# Patient Record
Sex: MALE | Race: WHITE | NOT HISPANIC OR LATINO | URBAN - METROPOLITAN AREA
[De-identification: names, ages, dates, MRNs, and addresses within clinical notes are randomized per-mention and may not be internally consistent; named-entity substitution may affect disease eponyms.]

---

## 2017-11-20 ENCOUNTER — OFFICE (OUTPATIENT)
Dept: URBAN - METROPOLITAN AREA CLINIC 11 | Facility: CLINIC | Age: 72
End: 2017-11-20

## 2017-11-20 VITALS
WEIGHT: 195 LBS | HEIGHT: 69 IN | SYSTOLIC BLOOD PRESSURE: 154 MMHG | DIASTOLIC BLOOD PRESSURE: 94 MMHG | HEART RATE: 76 BPM

## 2017-11-20 DIAGNOSIS — K21.9 GASTRO-ESOPHAGEAL REFLUX DISEASE WITHOUT ESOPHAGITIS: ICD-10-CM

## 2017-11-20 DIAGNOSIS — K52.0 GASTROENTERITIS AND COLITIS DUE TO RADIATION: ICD-10-CM

## 2017-11-20 PROCEDURE — 99213 OFFICE O/P EST LOW 20 MIN: CPT | Performed by: INTERNAL MEDICINE

## 2018-07-03 ENCOUNTER — OFFICE (OUTPATIENT)
Dept: URBAN - METROPOLITAN AREA CLINIC 11 | Facility: CLINIC | Age: 73
End: 2018-07-03

## 2018-07-03 VITALS
SYSTOLIC BLOOD PRESSURE: 152 MMHG | HEIGHT: 69 IN | HEART RATE: 79 BPM | WEIGHT: 190 LBS | DIASTOLIC BLOOD PRESSURE: 83 MMHG

## 2018-07-03 DIAGNOSIS — Z86.010 PERSONAL HISTORY OF COLONIC POLYPS: ICD-10-CM

## 2018-07-03 DIAGNOSIS — K21.9 GASTRO-ESOPHAGEAL REFLUX DISEASE WITHOUT ESOPHAGITIS: ICD-10-CM

## 2018-07-03 DIAGNOSIS — R13.10 DYSPHAGIA, UNSPECIFIED: ICD-10-CM

## 2018-07-03 PROCEDURE — 99213 OFFICE O/P EST LOW 20 MIN: CPT | Performed by: INTERNAL MEDICINE

## 2018-07-03 RX ORDER — POLYETHYLENE GLYCOL 3350, SODIUM SULFATE ANHYDROUS, SODIUM BICARBONATE, SODIUM CHLORIDE, POTASSIUM CHLORIDE 236; 22.74; 6.74; 5.86; 2.97 G/4L; G/4L; G/4L; G/4L; G/4L
POWDER, FOR SOLUTION ORAL
Qty: 1 | Refills: 0 | Status: COMPLETED
Start: 2018-07-03 | End: 2018-07-17

## 2018-07-03 NOTE — SERVICEHPINOTES
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  Random upper sternal/suprasternal dysphagia to solids with symptoms persisting for up to 5-10 minutes.  No rejection of food bolus.  No retrosternal burning.  Also with random fecal urgency without obvious precipitants except for possibly spicy foods.  Stool may be hard or soft.  Occasional small volume bleeding with bright red blood on the tissue.

## 2018-07-17 ENCOUNTER — OFFICE (OUTPATIENT)
Dept: URBAN - METROPOLITAN AREA PATHOLOGY 22 | Facility: PATHOLOGY | Age: 73
End: 2018-07-17
Payer: MEDICARE

## 2018-07-17 ENCOUNTER — AMBULATORY SURGICAL CENTER (OUTPATIENT)
Dept: URBAN - METROPOLITAN AREA SURGERY 3 | Facility: SURGERY | Age: 73
End: 2018-07-17

## 2018-07-17 ENCOUNTER — AMBULATORY SURGICAL CENTER (OUTPATIENT)
Dept: URBAN - METROPOLITAN AREA SURGERY 3 | Facility: SURGERY | Age: 73
End: 2018-07-17
Payer: MEDICARE

## 2018-07-17 VITALS
HEART RATE: 64 BPM | RESPIRATION RATE: 16 BRPM | HEART RATE: 62 BPM | SYSTOLIC BLOOD PRESSURE: 148 MMHG | DIASTOLIC BLOOD PRESSURE: 78 MMHG | OXYGEN SATURATION: 97 % | HEART RATE: 63 BPM | HEART RATE: 69 BPM | HEART RATE: 62 BPM | SYSTOLIC BLOOD PRESSURE: 130 MMHG | WEIGHT: 185 LBS | RESPIRATION RATE: 17 BRPM | OXYGEN SATURATION: 95 % | DIASTOLIC BLOOD PRESSURE: 78 MMHG | DIASTOLIC BLOOD PRESSURE: 85 MMHG | SYSTOLIC BLOOD PRESSURE: 138 MMHG | RESPIRATION RATE: 18 BRPM | DIASTOLIC BLOOD PRESSURE: 93 MMHG | WEIGHT: 185 LBS | DIASTOLIC BLOOD PRESSURE: 74 MMHG | DIASTOLIC BLOOD PRESSURE: 82 MMHG | SYSTOLIC BLOOD PRESSURE: 124 MMHG | SYSTOLIC BLOOD PRESSURE: 121 MMHG | SYSTOLIC BLOOD PRESSURE: 148 MMHG | TEMPERATURE: 97.2 F | HEIGHT: 69 IN | TEMPERATURE: 97.2 F | DIASTOLIC BLOOD PRESSURE: 93 MMHG | TEMPERATURE: 97 F | HEART RATE: 69 BPM | SYSTOLIC BLOOD PRESSURE: 138 MMHG | OXYGEN SATURATION: 96 % | OXYGEN SATURATION: 95 % | SYSTOLIC BLOOD PRESSURE: 121 MMHG | RESPIRATION RATE: 17 BRPM | DIASTOLIC BLOOD PRESSURE: 85 MMHG | OXYGEN SATURATION: 97 % | SYSTOLIC BLOOD PRESSURE: 124 MMHG | HEIGHT: 69 IN | DIASTOLIC BLOOD PRESSURE: 74 MMHG | SYSTOLIC BLOOD PRESSURE: 130 MMHG | HEART RATE: 64 BPM | OXYGEN SATURATION: 96 % | RESPIRATION RATE: 16 BRPM | DIASTOLIC BLOOD PRESSURE: 82 MMHG | TEMPERATURE: 97 F | HEART RATE: 63 BPM | RESPIRATION RATE: 18 BRPM

## 2018-07-17 DIAGNOSIS — D12.2 BENIGN NEOPLASM OF ASCENDING COLON: ICD-10-CM

## 2018-07-17 DIAGNOSIS — Z86.010 PERSONAL HISTORY OF COLONIC POLYPS: ICD-10-CM

## 2018-07-17 DIAGNOSIS — K31.89 OTHER DISEASES OF STOMACH AND DUODENUM: ICD-10-CM

## 2018-07-17 DIAGNOSIS — K44.9 DIAPHRAGMATIC HERNIA WITHOUT OBSTRUCTION OR GANGRENE: ICD-10-CM

## 2018-07-17 DIAGNOSIS — R13.10 DYSPHAGIA, UNSPECIFIED: ICD-10-CM

## 2018-07-17 DIAGNOSIS — K57.30 DIVERTICULOSIS OF LARGE INTESTINE WITHOUT PERFORATION OR ABS: ICD-10-CM

## 2018-07-17 DIAGNOSIS — K55.20 ANGIODYSPLASIA OF COLON WITHOUT HEMORRHAGE: ICD-10-CM

## 2018-07-17 DIAGNOSIS — K20.9 ESOPHAGITIS, UNSPECIFIED: ICD-10-CM

## 2018-07-17 PROCEDURE — 88342 IMHCHEM/IMCYTCHM 1ST ANTB: CPT | Performed by: INTERNAL MEDICINE

## 2018-07-17 PROCEDURE — 43239 EGD BIOPSY SINGLE/MULTIPLE: CPT | Mod: 51 | Performed by: INTERNAL MEDICINE

## 2018-07-17 PROCEDURE — 45385 COLONOSCOPY W/LESION REMOVAL: CPT | Mod: PT | Performed by: INTERNAL MEDICINE

## 2018-07-17 PROCEDURE — 88313 SPECIAL STAINS GROUP 2: CPT | Performed by: INTERNAL MEDICINE

## 2018-07-17 PROCEDURE — 88305 TISSUE EXAM BY PATHOLOGIST: CPT | Performed by: INTERNAL MEDICINE

## 2018-07-17 RX ORDER — OMEPRAZOLE 40 MG/1
40 CAPSULE, DELAYED RELEASE ORAL
Qty: 90 | Refills: 3 | Status: COMPLETED
End: 2021-09-30

## 2018-09-18 ENCOUNTER — AMBULATORY SURGICAL CENTER (OUTPATIENT)
Dept: URBAN - METROPOLITAN AREA SURGERY 3 | Facility: SURGERY | Age: 73
End: 2018-09-18
Payer: MEDICARE

## 2018-09-18 ENCOUNTER — AMBULATORY SURGICAL CENTER (OUTPATIENT)
Dept: URBAN - METROPOLITAN AREA SURGERY 3 | Facility: SURGERY | Age: 73
End: 2018-09-18

## 2018-09-18 VITALS
RESPIRATION RATE: 14 BRPM | RESPIRATION RATE: 19 BRPM | SYSTOLIC BLOOD PRESSURE: 109 MMHG | SYSTOLIC BLOOD PRESSURE: 110 MMHG | DIASTOLIC BLOOD PRESSURE: 68 MMHG | SYSTOLIC BLOOD PRESSURE: 157 MMHG | SYSTOLIC BLOOD PRESSURE: 114 MMHG | DIASTOLIC BLOOD PRESSURE: 71 MMHG | RESPIRATION RATE: 16 BRPM | TEMPERATURE: 97.1 F | OXYGEN SATURATION: 94 % | HEART RATE: 65 BPM | HEART RATE: 68 BPM | SYSTOLIC BLOOD PRESSURE: 110 MMHG | WEIGHT: 180 LBS | SYSTOLIC BLOOD PRESSURE: 116 MMHG | DIASTOLIC BLOOD PRESSURE: 72 MMHG | SYSTOLIC BLOOD PRESSURE: 108 MMHG | DIASTOLIC BLOOD PRESSURE: 72 MMHG | HEIGHT: 68 IN | TEMPERATURE: 98 F | HEART RATE: 62 BPM | HEART RATE: 63 BPM | RESPIRATION RATE: 17 BRPM | SYSTOLIC BLOOD PRESSURE: 109 MMHG | OXYGEN SATURATION: 93 % | TEMPERATURE: 97.1 F | RESPIRATION RATE: 16 BRPM | SYSTOLIC BLOOD PRESSURE: 157 MMHG | WEIGHT: 180 LBS | DIASTOLIC BLOOD PRESSURE: 65 MMHG | DIASTOLIC BLOOD PRESSURE: 87 MMHG | OXYGEN SATURATION: 94 % | DIASTOLIC BLOOD PRESSURE: 68 MMHG | OXYGEN SATURATION: 95 % | DIASTOLIC BLOOD PRESSURE: 65 MMHG | HEART RATE: 68 BPM | DIASTOLIC BLOOD PRESSURE: 71 MMHG | OXYGEN SATURATION: 95 % | OXYGEN SATURATION: 97 % | DIASTOLIC BLOOD PRESSURE: 87 MMHG | RESPIRATION RATE: 17 BRPM | OXYGEN SATURATION: 93 % | TEMPERATURE: 98 F | HEART RATE: 65 BPM | SYSTOLIC BLOOD PRESSURE: 114 MMHG | SYSTOLIC BLOOD PRESSURE: 116 MMHG | OXYGEN SATURATION: 97 % | RESPIRATION RATE: 19 BRPM | HEIGHT: 68 IN | HEART RATE: 62 BPM | HEART RATE: 63 BPM | RESPIRATION RATE: 14 BRPM | SYSTOLIC BLOOD PRESSURE: 108 MMHG

## 2018-09-18 DIAGNOSIS — K44.9 DIAPHRAGMATIC HERNIA WITHOUT OBSTRUCTION OR GANGRENE: ICD-10-CM

## 2018-09-18 DIAGNOSIS — K29.70 GASTRITIS, UNSPECIFIED, WITHOUT BLEEDING: ICD-10-CM

## 2018-09-18 PROCEDURE — 43235 EGD DIAGNOSTIC BRUSH WASH: CPT | Performed by: INTERNAL MEDICINE

## 2019-09-23 ENCOUNTER — OFFICE (OUTPATIENT)
Dept: URBAN - METROPOLITAN AREA CLINIC 11 | Facility: CLINIC | Age: 74
End: 2019-09-23

## 2019-09-23 VITALS
DIASTOLIC BLOOD PRESSURE: 82 MMHG | HEIGHT: 68 IN | WEIGHT: 186 LBS | SYSTOLIC BLOOD PRESSURE: 137 MMHG | HEART RATE: 75 BPM

## 2019-09-23 DIAGNOSIS — K20.9 ESOPHAGITIS, UNSPECIFIED: ICD-10-CM

## 2019-09-23 DIAGNOSIS — K52.0 GASTROENTERITIS AND COLITIS DUE TO RADIATION: ICD-10-CM

## 2019-09-23 PROCEDURE — 99214 OFFICE O/P EST MOD 30 MIN: CPT | Performed by: INTERNAL MEDICINE

## 2019-09-23 RX ORDER — OMEPRAZOLE 40 MG/1
40 CAPSULE, DELAYED RELEASE ORAL
Qty: 90 | Refills: 3 | Status: COMPLETED
End: 2021-09-30

## 2020-09-29 ENCOUNTER — OFFICE (OUTPATIENT)
Dept: URBAN - METROPOLITAN AREA CLINIC 11 | Facility: CLINIC | Age: 75
End: 2020-09-29
Payer: MEDICARE

## 2020-09-29 VITALS
SYSTOLIC BLOOD PRESSURE: 156 MMHG | OXYGEN SATURATION: 99 % | HEIGHT: 68 IN | HEART RATE: 73 BPM | DIASTOLIC BLOOD PRESSURE: 72 MMHG | WEIGHT: 181 LBS

## 2020-09-29 DIAGNOSIS — K52.0 GASTROENTERITIS AND COLITIS DUE TO RADIATION: ICD-10-CM

## 2020-09-29 DIAGNOSIS — K21.9 GASTRO-ESOPHAGEAL REFLUX DISEASE WITHOUT ESOPHAGITIS: ICD-10-CM

## 2020-09-29 PROBLEM — K20.9 ESOPHAGITIS, UNSPECIFIED: Status: INACTIVE | Noted: 2018-07-17

## 2020-09-29 PROBLEM — K29.70 GASTRITIS, UNSPECIFIED, WITHOUT BLEEDING: Status: INACTIVE | Noted: 2018-07-17

## 2020-09-29 PROBLEM — K44.9 DIAPHRAGMATIC HERNIA WITHOUT OBSTRUCTION OR GANGRENE: Status: INACTIVE | Noted: 2018-07-17

## 2020-09-29 PROCEDURE — 99213 OFFICE O/P EST LOW 20 MIN: CPT | Performed by: INTERNAL MEDICINE

## 2021-09-30 ENCOUNTER — OFFICE (OUTPATIENT)
Dept: URBAN - METROPOLITAN AREA CLINIC 11 | Facility: CLINIC | Age: 76
End: 2021-09-30

## 2021-09-30 VITALS
DIASTOLIC BLOOD PRESSURE: 94 MMHG | HEIGHT: 68 IN | HEART RATE: 69 BPM | OXYGEN SATURATION: 99 % | SYSTOLIC BLOOD PRESSURE: 177 MMHG | WEIGHT: 185 LBS

## 2021-09-30 DIAGNOSIS — K52.0 GASTROENTERITIS AND COLITIS DUE TO RADIATION: ICD-10-CM

## 2021-09-30 DIAGNOSIS — R13.10 DYSPHAGIA, UNSPECIFIED: ICD-10-CM

## 2021-09-30 DIAGNOSIS — R19.7 DIARRHEA, UNSPECIFIED: ICD-10-CM

## 2021-09-30 DIAGNOSIS — K21.9 GASTRO-ESOPHAGEAL REFLUX DISEASE WITHOUT ESOPHAGITIS: ICD-10-CM

## 2021-09-30 PROCEDURE — 99213 OFFICE O/P EST LOW 20 MIN: CPT | Performed by: INTERNAL MEDICINE

## 2021-10-15 ENCOUNTER — AMBULATORY SURGICAL CENTER (OUTPATIENT)
Dept: URBAN - METROPOLITAN AREA SURGERY 3 | Facility: SURGERY | Age: 76
End: 2021-10-15

## 2021-10-15 ENCOUNTER — AMBULATORY SURGICAL CENTER (OUTPATIENT)
Dept: URBAN - METROPOLITAN AREA SURGERY 3 | Facility: SURGERY | Age: 76
End: 2021-10-15
Payer: MEDICARE

## 2021-10-15 ENCOUNTER — OFFICE (OUTPATIENT)
Dept: URBAN - METROPOLITAN AREA PATHOLOGY 22 | Facility: PATHOLOGY | Age: 76
End: 2021-10-15
Payer: MEDICARE

## 2021-10-15 VITALS
TEMPERATURE: 97.8 F | DIASTOLIC BLOOD PRESSURE: 77 MMHG | SYSTOLIC BLOOD PRESSURE: 114 MMHG | TEMPERATURE: 98.2 F | DIASTOLIC BLOOD PRESSURE: 73 MMHG | TEMPERATURE: 98.2 F | HEART RATE: 68 BPM | RESPIRATION RATE: 16 BRPM | RESPIRATION RATE: 18 BRPM | OXYGEN SATURATION: 95 % | SYSTOLIC BLOOD PRESSURE: 114 MMHG | HEART RATE: 68 BPM | HEART RATE: 66 BPM | DIASTOLIC BLOOD PRESSURE: 88 MMHG | RESPIRATION RATE: 18 BRPM | DIASTOLIC BLOOD PRESSURE: 73 MMHG | DIASTOLIC BLOOD PRESSURE: 77 MMHG | SYSTOLIC BLOOD PRESSURE: 126 MMHG | SYSTOLIC BLOOD PRESSURE: 126 MMHG | OXYGEN SATURATION: 95 % | DIASTOLIC BLOOD PRESSURE: 75 MMHG | RESPIRATION RATE: 17 BRPM | SYSTOLIC BLOOD PRESSURE: 155 MMHG | HEART RATE: 66 BPM | HEART RATE: 64 BPM | DIASTOLIC BLOOD PRESSURE: 88 MMHG | DIASTOLIC BLOOD PRESSURE: 75 MMHG | RESPIRATION RATE: 17 BRPM | HEART RATE: 63 BPM | OXYGEN SATURATION: 96 % | SYSTOLIC BLOOD PRESSURE: 127 MMHG | TEMPERATURE: 97.8 F | SYSTOLIC BLOOD PRESSURE: 155 MMHG | OXYGEN SATURATION: 95 % | DIASTOLIC BLOOD PRESSURE: 73 MMHG | RESPIRATION RATE: 16 BRPM | RESPIRATION RATE: 18 BRPM | DIASTOLIC BLOOD PRESSURE: 56 MMHG | HEART RATE: 64 BPM | SYSTOLIC BLOOD PRESSURE: 124 MMHG | DIASTOLIC BLOOD PRESSURE: 75 MMHG | SYSTOLIC BLOOD PRESSURE: 155 MMHG | RESPIRATION RATE: 19 BRPM | HEART RATE: 68 BPM | SYSTOLIC BLOOD PRESSURE: 124 MMHG | WEIGHT: 181 LBS | DIASTOLIC BLOOD PRESSURE: 77 MMHG | RESPIRATION RATE: 20 BRPM | HEART RATE: 63 BPM | HEART RATE: 64 BPM | WEIGHT: 181 LBS | DIASTOLIC BLOOD PRESSURE: 88 MMHG | HEART RATE: 66 BPM | RESPIRATION RATE: 19 BRPM | DIASTOLIC BLOOD PRESSURE: 56 MMHG | HEIGHT: 68 IN | TEMPERATURE: 97.8 F | WEIGHT: 181 LBS | RESPIRATION RATE: 19 BRPM | OXYGEN SATURATION: 96 % | RESPIRATION RATE: 20 BRPM | RESPIRATION RATE: 17 BRPM | SYSTOLIC BLOOD PRESSURE: 127 MMHG | HEART RATE: 63 BPM | RESPIRATION RATE: 16 BRPM | OXYGEN SATURATION: 96 % | SYSTOLIC BLOOD PRESSURE: 114 MMHG | HEIGHT: 68 IN | DIASTOLIC BLOOD PRESSURE: 56 MMHG | TEMPERATURE: 98.2 F | SYSTOLIC BLOOD PRESSURE: 127 MMHG | HEIGHT: 68 IN | RESPIRATION RATE: 20 BRPM | SYSTOLIC BLOOD PRESSURE: 126 MMHG | SYSTOLIC BLOOD PRESSURE: 124 MMHG

## 2021-10-15 DIAGNOSIS — R13.10 DYSPHAGIA, UNSPECIFIED: ICD-10-CM

## 2021-10-15 DIAGNOSIS — K52.9 NONINFECTIVE GASTROENTERITIS AND COLITIS, UNSPECIFIED: ICD-10-CM

## 2021-10-15 DIAGNOSIS — K44.9 DIAPHRAGMATIC HERNIA WITHOUT OBSTRUCTION OR GANGRENE: ICD-10-CM

## 2021-10-15 DIAGNOSIS — K21.00 GASTRO-ESOPHAGEAL REFLUX DISEASE WITH ESOPHAGITIS, WITHOUT B: ICD-10-CM

## 2021-10-15 PROCEDURE — 88305 TISSUE EXAM BY PATHOLOGIST: CPT | Performed by: INTERNAL MEDICINE

## 2021-10-15 PROCEDURE — 88312 SPECIAL STAINS GROUP 1: CPT | Performed by: INTERNAL MEDICINE

## 2021-10-15 PROCEDURE — 88313 SPECIAL STAINS GROUP 2: CPT | Performed by: INTERNAL MEDICINE

## 2021-10-15 PROCEDURE — 88342 IMHCHEM/IMCYTCHM 1ST ANTB: CPT | Performed by: INTERNAL MEDICINE

## 2021-10-15 PROCEDURE — 43239 EGD BIOPSY SINGLE/MULTIPLE: CPT | Performed by: INTERNAL MEDICINE

## 2021-10-15 RX ORDER — OMEPRAZOLE 20 MG/1
40 CAPSULE, DELAYED RELEASE ORAL
Qty: 60 | Refills: 11 | Status: ACTIVE
Start: 2021-10-15

## 2021-12-08 ENCOUNTER — INPATIENT HOSPITAL (OUTPATIENT)
Dept: URBAN - METROPOLITAN AREA HOSPITAL 95 | Facility: HOSPITAL | Age: 76
End: 2021-12-08
Payer: MEDICARE

## 2021-12-08 DIAGNOSIS — N17.9 ACUTE KIDNEY FAILURE, UNSPECIFIED: ICD-10-CM

## 2021-12-08 DIAGNOSIS — R10.13 EPIGASTRIC PAIN: ICD-10-CM

## 2021-12-08 DIAGNOSIS — K22.10 ULCER OF ESOPHAGUS WITHOUT BLEEDING: ICD-10-CM

## 2021-12-08 PROCEDURE — 99222 1ST HOSP IP/OBS MODERATE 55: CPT | Performed by: NURSE PRACTITIONER

## 2021-12-09 ENCOUNTER — INPATIENT HOSPITAL (OUTPATIENT)
Dept: URBAN - METROPOLITAN AREA HOSPITAL 95 | Facility: HOSPITAL | Age: 76
End: 2021-12-09
Payer: MEDICARE

## 2021-12-09 DIAGNOSIS — N17.9 ACUTE KIDNEY FAILURE, UNSPECIFIED: ICD-10-CM

## 2021-12-09 DIAGNOSIS — K22.10 ULCER OF ESOPHAGUS WITHOUT BLEEDING: ICD-10-CM

## 2021-12-09 DIAGNOSIS — R10.13 EPIGASTRIC PAIN: ICD-10-CM

## 2021-12-09 PROCEDURE — 99232 SBSQ HOSP IP/OBS MODERATE 35: CPT | Performed by: INTERNAL MEDICINE

## 2021-12-10 ENCOUNTER — INPATIENT HOSPITAL (OUTPATIENT)
Dept: URBAN - METROPOLITAN AREA HOSPITAL 95 | Facility: HOSPITAL | Age: 76
End: 2021-12-10
Payer: MEDICARE

## 2021-12-10 DIAGNOSIS — K22.10 ULCER OF ESOPHAGUS WITHOUT BLEEDING: ICD-10-CM

## 2021-12-10 DIAGNOSIS — K22.2 ESOPHAGEAL OBSTRUCTION: ICD-10-CM

## 2021-12-10 DIAGNOSIS — K44.9 DIAPHRAGMATIC HERNIA WITHOUT OBSTRUCTION OR GANGRENE: ICD-10-CM

## 2021-12-10 DIAGNOSIS — K29.40 CHRONIC ATROPHIC GASTRITIS WITHOUT BLEEDING: ICD-10-CM

## 2021-12-10 PROCEDURE — 43450 DILATE ESOPHAGUS 1/MULT PASS: CPT | Performed by: INTERNAL MEDICINE

## 2021-12-10 PROCEDURE — 43239 EGD BIOPSY SINGLE/MULTIPLE: CPT | Performed by: INTERNAL MEDICINE

## 2021-12-11 ENCOUNTER — INPATIENT HOSPITAL (OUTPATIENT)
Dept: URBAN - METROPOLITAN AREA HOSPITAL 95 | Facility: HOSPITAL | Age: 76
End: 2021-12-11
Payer: MEDICARE

## 2021-12-11 DIAGNOSIS — K29.70 GASTRITIS, UNSPECIFIED, WITHOUT BLEEDING: ICD-10-CM

## 2021-12-11 DIAGNOSIS — K22.10 ULCER OF ESOPHAGUS WITHOUT BLEEDING: ICD-10-CM

## 2021-12-11 DIAGNOSIS — R10.13 EPIGASTRIC PAIN: ICD-10-CM

## 2021-12-11 DIAGNOSIS — Q39.4 ESOPHAGEAL WEB: ICD-10-CM

## 2021-12-11 PROCEDURE — 99231 SBSQ HOSP IP/OBS SF/LOW 25: CPT | Performed by: INTERNAL MEDICINE

## 2022-02-02 ENCOUNTER — OFFICE (OUTPATIENT)
Dept: URBAN - METROPOLITAN AREA CLINIC 11 | Facility: CLINIC | Age: 77
End: 2022-02-02

## 2022-02-02 VITALS
WEIGHT: 180 LBS | HEIGHT: 68 IN | DIASTOLIC BLOOD PRESSURE: 72 MMHG | OXYGEN SATURATION: 99 % | HEART RATE: 84 BPM | SYSTOLIC BLOOD PRESSURE: 132 MMHG

## 2022-02-02 DIAGNOSIS — K21.9 GASTRO-ESOPHAGEAL REFLUX DISEASE WITHOUT ESOPHAGITIS: ICD-10-CM

## 2022-02-02 DIAGNOSIS — R19.7 DIARRHEA, UNSPECIFIED: ICD-10-CM

## 2022-02-02 DIAGNOSIS — K22.10 ULCER OF ESOPHAGUS WITHOUT BLEEDING: ICD-10-CM

## 2022-02-02 PROBLEM — K55.20 ANGIODYSPLASIA OF COLON WITHOUT HEMORRHAGE: Status: ACTIVE | Noted: 2018-07-17

## 2022-02-02 PROBLEM — K31.89 OTHER DISEASES OF STOMACH AND DUODENUM: Status: INACTIVE | Noted: 2021-10-15

## 2022-02-02 PROCEDURE — 99213 OFFICE O/P EST LOW 20 MIN: CPT | Performed by: INTERNAL MEDICINE

## 2022-03-18 ENCOUNTER — AMBULATORY SURGICAL CENTER (OUTPATIENT)
Dept: URBAN - METROPOLITAN AREA SURGERY 2 | Facility: SURGERY | Age: 77
End: 2022-03-18
Payer: MEDICARE

## 2022-03-18 ENCOUNTER — OFFICE (OUTPATIENT)
Dept: URBAN - METROPOLITAN AREA PATHOLOGY 22 | Facility: PATHOLOGY | Age: 77
End: 2022-03-18
Payer: MEDICARE

## 2022-03-18 ENCOUNTER — AMBULATORY SURGICAL CENTER (OUTPATIENT)
Dept: URBAN - METROPOLITAN AREA SURGERY 2 | Facility: SURGERY | Age: 77
End: 2022-03-18

## 2022-03-18 VITALS
TEMPERATURE: 98.5 F | SYSTOLIC BLOOD PRESSURE: 143 MMHG | SYSTOLIC BLOOD PRESSURE: 104 MMHG | RESPIRATION RATE: 18 BRPM | HEART RATE: 75 BPM | OXYGEN SATURATION: 98 % | HEART RATE: 68 BPM | HEART RATE: 69 BPM | HEART RATE: 75 BPM | DIASTOLIC BLOOD PRESSURE: 52 MMHG | OXYGEN SATURATION: 97 % | RESPIRATION RATE: 16 BRPM | SYSTOLIC BLOOD PRESSURE: 101 MMHG | TEMPERATURE: 98.5 F | SYSTOLIC BLOOD PRESSURE: 93 MMHG | WEIGHT: 175 LBS | TEMPERATURE: 97.7 F | SYSTOLIC BLOOD PRESSURE: 93 MMHG | DIASTOLIC BLOOD PRESSURE: 52 MMHG | HEART RATE: 69 BPM | DIASTOLIC BLOOD PRESSURE: 60 MMHG | HEART RATE: 68 BPM | DIASTOLIC BLOOD PRESSURE: 56 MMHG | SYSTOLIC BLOOD PRESSURE: 126 MMHG | SYSTOLIC BLOOD PRESSURE: 126 MMHG | HEART RATE: 75 BPM | SYSTOLIC BLOOD PRESSURE: 104 MMHG | DIASTOLIC BLOOD PRESSURE: 80 MMHG | SYSTOLIC BLOOD PRESSURE: 143 MMHG | DIASTOLIC BLOOD PRESSURE: 55 MMHG | RESPIRATION RATE: 18 BRPM | HEIGHT: 68 IN | RESPIRATION RATE: 16 BRPM | DIASTOLIC BLOOD PRESSURE: 56 MMHG | DIASTOLIC BLOOD PRESSURE: 52 MMHG | RESPIRATION RATE: 18 BRPM | TEMPERATURE: 97.7 F | HEART RATE: 70 BPM | HEART RATE: 69 BPM | DIASTOLIC BLOOD PRESSURE: 55 MMHG | WEIGHT: 175 LBS | SYSTOLIC BLOOD PRESSURE: 101 MMHG | TEMPERATURE: 97.7 F | OXYGEN SATURATION: 96 % | RESPIRATION RATE: 16 BRPM | SYSTOLIC BLOOD PRESSURE: 143 MMHG | SYSTOLIC BLOOD PRESSURE: 93 MMHG | OXYGEN SATURATION: 97 % | SYSTOLIC BLOOD PRESSURE: 104 MMHG | DIASTOLIC BLOOD PRESSURE: 60 MMHG | OXYGEN SATURATION: 98 % | DIASTOLIC BLOOD PRESSURE: 60 MMHG | DIASTOLIC BLOOD PRESSURE: 55 MMHG | OXYGEN SATURATION: 96 % | DIASTOLIC BLOOD PRESSURE: 80 MMHG | TEMPERATURE: 98.5 F | OXYGEN SATURATION: 96 % | HEIGHT: 68 IN | OXYGEN SATURATION: 97 % | HEART RATE: 70 BPM | SYSTOLIC BLOOD PRESSURE: 126 MMHG | HEART RATE: 70 BPM | HEIGHT: 68 IN | SYSTOLIC BLOOD PRESSURE: 101 MMHG | OXYGEN SATURATION: 98 % | DIASTOLIC BLOOD PRESSURE: 56 MMHG | WEIGHT: 175 LBS | HEART RATE: 68 BPM | DIASTOLIC BLOOD PRESSURE: 80 MMHG

## 2022-03-18 DIAGNOSIS — T18.2XXA FOREIGN BODY IN STOMACH, INITIAL ENCOUNTER: ICD-10-CM

## 2022-03-18 DIAGNOSIS — K21.00 GASTRO-ESOPHAGEAL REFLUX DISEASE WITH ESOPHAGITIS, WITHOUT B: ICD-10-CM

## 2022-03-18 DIAGNOSIS — K44.9 DIAPHRAGMATIC HERNIA WITHOUT OBSTRUCTION OR GANGRENE: ICD-10-CM

## 2022-03-18 DIAGNOSIS — K22.10 ULCER OF ESOPHAGUS WITHOUT BLEEDING: ICD-10-CM

## 2022-03-18 PROCEDURE — 88313 SPECIAL STAINS GROUP 2: CPT | Performed by: PATHOLOGY

## 2022-03-18 PROCEDURE — 88305 TISSUE EXAM BY PATHOLOGIST: CPT | Performed by: PATHOLOGY

## 2022-03-18 PROCEDURE — 43239 EGD BIOPSY SINGLE/MULTIPLE: CPT | Performed by: INTERNAL MEDICINE

## 2022-03-25 PROBLEM — K22.10 ULCER OF ESOPHAGUS WITHOUT BLEEDING: Status: ACTIVE | Noted: 2021-10-15

## 2024-01-03 ENCOUNTER — OFFICE (OUTPATIENT)
Dept: URBAN - METROPOLITAN AREA CLINIC 11 | Facility: CLINIC | Age: 79
End: 2024-01-03

## 2024-01-03 VITALS
DIASTOLIC BLOOD PRESSURE: 87 MMHG | SYSTOLIC BLOOD PRESSURE: 172 MMHG | HEIGHT: 68 IN | HEART RATE: 70 BPM | OXYGEN SATURATION: 97 % | WEIGHT: 167 LBS

## 2024-01-03 DIAGNOSIS — K21.9 GASTRO-ESOPHAGEAL REFLUX DISEASE WITHOUT ESOPHAGITIS: ICD-10-CM

## 2024-01-03 DIAGNOSIS — B19.10 UNSPECIFIED VIRAL HEPATITIS B WITHOUT HEPATIC COMA: ICD-10-CM

## 2024-01-03 DIAGNOSIS — K62.5 HEMORRHAGE OF ANUS AND RECTUM: ICD-10-CM

## 2024-01-03 PROCEDURE — 99214 OFFICE O/P EST MOD 30 MIN: CPT | Performed by: INTERNAL MEDICINE

## 2024-01-04 LAB
CBC, PLATELET, NO DIFFERENTIAL: HEMATOCRIT: 31.3 % — LOW (ref 37.5–51)
CBC, PLATELET, NO DIFFERENTIAL: HEMOGLOBIN: 10.2 G/DL — LOW (ref 13–17.7)
CBC, PLATELET, NO DIFFERENTIAL: MCH: 33 PG (ref 26.6–33)
CBC, PLATELET, NO DIFFERENTIAL: MCHC: 32.6 G/DL (ref 31.5–35.7)
CBC, PLATELET, NO DIFFERENTIAL: MCV: 101 FL — HIGH (ref 79–97)
CBC, PLATELET, NO DIFFERENTIAL: PLATELETS: 115 X10E3/UL — LOW (ref 150–450)
CBC, PLATELET, NO DIFFERENTIAL: RBC: 3.09 X10E6/UL — LOW (ref 4.14–5.8)
CBC, PLATELET, NO DIFFERENTIAL: RDW: 13.1 % (ref 11.6–15.4)
CBC, PLATELET, NO DIFFERENTIAL: WBC: 6.7 X10E3/UL (ref 3.4–10.8)
HBV REAL-TIME PCR, QUANT: HBV IU/ML: (no result) IU/ML
HBV REAL-TIME PCR, QUANT: LOG10 HBV IU/ML: (no result) LOG10 IU/ML
HBV REAL-TIME PCR, QUANT: TEST INFORMATION: (no result)
HEPATIC FUNCTION PANEL (7): ALBUMIN: 4.1 G/DL (ref 3.8–4.8)
HEPATIC FUNCTION PANEL (7): ALKALINE PHOSPHATASE: 185 IU/L — HIGH (ref 44–121)
HEPATIC FUNCTION PANEL (7): ALT (SGPT): 57 IU/L — HIGH (ref 0–44)
HEPATIC FUNCTION PANEL (7): AST (SGOT): 39 IU/L (ref 0–40)
HEPATIC FUNCTION PANEL (7): BILIRUBIN, DIRECT: 0.13 MG/DL (ref 0–0.4)
HEPATIC FUNCTION PANEL (7): BILIRUBIN, TOTAL: 0.4 MG/DL (ref 0–1.2)
HEPATIC FUNCTION PANEL (7): PROTEIN, TOTAL: 7.9 G/DL (ref 6–8.5)
PROTHROMBIN TIME (PT): INR: 1.2 (ref 0.9–1.2)
PROTHROMBIN TIME (PT): PROTHROMBIN TIME: 12 SEC (ref 9.1–12)

## 2024-01-29 ENCOUNTER — OFFICE (OUTPATIENT)
Dept: URBAN - METROPOLITAN AREA CLINIC 11 | Facility: CLINIC | Age: 79
End: 2024-01-29
Payer: MEDICARE

## 2024-01-29 VITALS — HEIGHT: 68 IN

## 2024-01-29 DIAGNOSIS — R74.01 ELEVATION OF LEVELS OF LIVER TRANSAMINASE LEVELS: ICD-10-CM

## 2024-01-29 PROCEDURE — 76981 USE PARENCHYMA: CPT | Performed by: INTERNAL MEDICINE

## 2024-02-06 ENCOUNTER — OFFICE (OUTPATIENT)
Dept: URBAN - METROPOLITAN AREA CLINIC 11 | Facility: CLINIC | Age: 79
End: 2024-02-06

## 2024-02-06 VITALS
HEART RATE: 72 BPM | WEIGHT: 169 LBS | SYSTOLIC BLOOD PRESSURE: 166 MMHG | DIASTOLIC BLOOD PRESSURE: 81 MMHG | HEIGHT: 68 IN | OXYGEN SATURATION: 100 %

## 2024-02-06 DIAGNOSIS — K21.9 GASTRO-ESOPHAGEAL REFLUX DISEASE WITHOUT ESOPHAGITIS: ICD-10-CM

## 2024-02-06 DIAGNOSIS — R74.01 ELEVATION OF LEVELS OF LIVER TRANSAMINASE LEVELS: ICD-10-CM

## 2024-02-06 PROBLEM — T18.2XXA FOREIGN BODY IN STOMACH, INITIAL ENCOUNTER: Status: INACTIVE | Noted: 2022-03-18

## 2024-02-06 PROBLEM — K22.70 BARRETT'S ESOPHAGUS WITHOUT DYSPLASIA: Status: ACTIVE | Noted: 2022-03-18

## 2024-02-06 PROCEDURE — 99213 OFFICE O/P EST LOW 20 MIN: CPT | Performed by: INTERNAL MEDICINE

## 2024-08-01 ENCOUNTER — OFFICE (OUTPATIENT)
Dept: URBAN - METROPOLITAN AREA CLINIC 9 | Facility: CLINIC | Age: 79
End: 2024-08-01
Payer: MEDICARE

## 2024-08-01 VITALS
SYSTOLIC BLOOD PRESSURE: 134 MMHG | WEIGHT: 169 LBS | HEIGHT: 68 IN | OXYGEN SATURATION: 98 % | DIASTOLIC BLOOD PRESSURE: 68 MMHG | HEART RATE: 80 BPM

## 2024-08-01 DIAGNOSIS — K62.5 HEMORRHAGE OF ANUS AND RECTUM: ICD-10-CM

## 2024-08-01 DIAGNOSIS — R74.01 ELEVATION OF LEVELS OF LIVER TRANSAMINASE LEVELS: ICD-10-CM

## 2024-08-01 PROCEDURE — 99214 OFFICE O/P EST MOD 30 MIN: CPT | Performed by: NURSE PRACTITIONER

## 2024-08-03 LAB
CBC, PLATELET, NO DIFFERENTIAL: HEMATOCRIT: 24.4 % — LOW (ref 37.5–51)
CBC, PLATELET, NO DIFFERENTIAL: HEMATOLOGY COMMENTS: (no result)
CBC, PLATELET, NO DIFFERENTIAL: HEMOGLOBIN: 7.7 G/DL — LOW (ref 13–17.7)
CBC, PLATELET, NO DIFFERENTIAL: MCH: 32.2 PG (ref 26.6–33)
CBC, PLATELET, NO DIFFERENTIAL: MCHC: 31.6 G/DL (ref 31.5–35.7)
CBC, PLATELET, NO DIFFERENTIAL: MCV: 102 FL — HIGH (ref 79–97)
CBC, PLATELET, NO DIFFERENTIAL: NRBC: (no result)
CBC, PLATELET, NO DIFFERENTIAL: PLATELETS: 100 X10E3/UL — CRITICAL LOW (ref 150–450)
CBC, PLATELET, NO DIFFERENTIAL: RBC: 2.39 X10E6/UL — CRITICAL LOW (ref 4.14–5.8)
CBC, PLATELET, NO DIFFERENTIAL: RDW: 13.9 % (ref 11.6–15.4)
CBC, PLATELET, NO DIFFERENTIAL: WBC: 4 X10E3/UL (ref 3.4–10.8)
COMP. METABOLIC PANEL (14): ALBUMIN: 4 G/DL (ref 3.8–4.8)
COMP. METABOLIC PANEL (14): ALKALINE PHOSPHATASE: 156 IU/L — HIGH (ref 44–121)
COMP. METABOLIC PANEL (14): ALT (SGPT): 15 IU/L (ref 0–44)
COMP. METABOLIC PANEL (14): AST (SGOT): 24 IU/L (ref 0–40)
COMP. METABOLIC PANEL (14): BILIRUBIN, TOTAL: 0.4 MG/DL (ref 0–1.2)
COMP. METABOLIC PANEL (14): BUN/CREATININE RATIO: 7 — LOW (ref 10–24)
COMP. METABOLIC PANEL (14): BUN: 34 MG/DL — HIGH (ref 8–27)
COMP. METABOLIC PANEL (14): CALCIUM: 9.2 MG/DL (ref 8.6–10.2)
COMP. METABOLIC PANEL (14): CARBON DIOXIDE, TOTAL: 24 MMOL/L (ref 20–29)
COMP. METABOLIC PANEL (14): CHLORIDE: 102 MMOL/L (ref 96–106)
COMP. METABOLIC PANEL (14): CREATININE: 4.55 MG/DL — HIGH (ref 0.76–1.27)
COMP. METABOLIC PANEL (14): EGFR: 13 ML/MIN/1.73 — LOW (ref 59–?)
COMP. METABOLIC PANEL (14): GLOBULIN, TOTAL: 3.4 G/DL (ref 1.5–4.5)
COMP. METABOLIC PANEL (14): GLUCOSE: 84 MG/DL (ref 70–99)
COMP. METABOLIC PANEL (14): POTASSIUM: 3.9 MMOL/L (ref 3.5–5.2)
COMP. METABOLIC PANEL (14): PROTEIN, TOTAL: 7.4 G/DL (ref 6–8.5)
COMP. METABOLIC PANEL (14): SODIUM: 144 MMOL/L (ref 134–144)
HBV REAL-TIME PCR, QUANT: HBV IU/ML: (no result) IU/ML
HBV REAL-TIME PCR, QUANT: LOG10 HBV IU/ML: (no result) LOG10 IU/ML
HBV REAL-TIME PCR, QUANT: TEST INFORMATION: (no result)

## 2024-08-08 ENCOUNTER — OFFICE (OUTPATIENT)
Dept: URBAN - METROPOLITAN AREA CLINIC 19 | Facility: CLINIC | Age: 79
End: 2024-08-08
Payer: MEDICARE

## 2024-08-08 DIAGNOSIS — R74.01 ELEVATION OF LEVELS OF LIVER TRANSAMINASE LEVELS: ICD-10-CM

## 2024-08-08 PROCEDURE — 76705 ECHO EXAM OF ABDOMEN: CPT | Mod: TC | Performed by: NURSE PRACTITIONER

## 2024-09-03 PROBLEM — K63.5 POLYP OF COLON: Status: ACTIVE | Noted: 2024-09-03

## 2024-09-03 PROBLEM — K92.2 EVALUATION OF UNEXPLAINED GI BLEEDING: Status: ACTIVE | Noted: 2024-09-03

## 2024-09-03 PROBLEM — K57.30 DIVERTICULOSIS OF LARGE INTESTINE WITHOUT PERFORATION OR ABS: Status: ACTIVE | Noted: 2024-09-03

## 2024-12-12 ENCOUNTER — INPATIENT HOSPITAL (OUTPATIENT)
Dept: URBAN - METROPOLITAN AREA HOSPITAL 95 | Facility: HOSPITAL | Age: 79
End: 2024-12-12
Payer: MEDICARE

## 2024-12-12 DIAGNOSIS — D64.9 ANEMIA, UNSPECIFIED: ICD-10-CM

## 2024-12-12 DIAGNOSIS — Z85.46 PERSONAL HISTORY OF MALIGNANT NEOPLASM OF PROSTATE: ICD-10-CM

## 2024-12-12 DIAGNOSIS — K92.1 MELENA: ICD-10-CM

## 2024-12-12 DIAGNOSIS — N18.6 END STAGE RENAL DISEASE: ICD-10-CM

## 2024-12-12 PROCEDURE — 99222 1ST HOSP IP/OBS MODERATE 55: CPT | Mod: FS

## 2024-12-13 ENCOUNTER — INPATIENT HOSPITAL (OUTPATIENT)
Dept: URBAN - METROPOLITAN AREA HOSPITAL 95 | Facility: HOSPITAL | Age: 79
End: 2024-12-13
Payer: MEDICARE

## 2024-12-13 DIAGNOSIS — D64.9 ANEMIA, UNSPECIFIED: ICD-10-CM

## 2024-12-13 DIAGNOSIS — K92.1 MELENA: ICD-10-CM

## 2024-12-13 DIAGNOSIS — Z85.46 PERSONAL HISTORY OF MALIGNANT NEOPLASM OF PROSTATE: ICD-10-CM

## 2024-12-13 DIAGNOSIS — K62.7 RADIATION PROCTITIS: ICD-10-CM

## 2024-12-13 PROCEDURE — 45334 SIGMOIDOSCOPY FOR BLEEDING: CPT | Performed by: INTERNAL MEDICINE

## 2024-12-31 PROBLEM — K92.2 EVALUATION OF UNEXPLAINED GI BLEEDING: Status: ACTIVE | Noted: 2024-09-03

## 2025-01-21 ENCOUNTER — INPATIENT HOSPITAL (OUTPATIENT)
Dept: URBAN - METROPOLITAN AREA HOSPITAL 95 | Facility: HOSPITAL | Age: 80
End: 2025-01-21
Payer: MEDICARE

## 2025-01-21 DIAGNOSIS — K92.1 MELENA: ICD-10-CM

## 2025-01-21 DIAGNOSIS — Z85.46 PERSONAL HISTORY OF MALIGNANT NEOPLASM OF PROSTATE: ICD-10-CM

## 2025-01-21 DIAGNOSIS — K62.7 RADIATION PROCTITIS: ICD-10-CM

## 2025-01-21 PROCEDURE — 45334 SIGMOIDOSCOPY FOR BLEEDING: CPT | Performed by: STUDENT IN AN ORGANIZED HEALTH CARE EDUCATION/TRAINING PROGRAM

## 2025-01-22 ENCOUNTER — INPATIENT HOSPITAL (OUTPATIENT)
Dept: URBAN - METROPOLITAN AREA HOSPITAL 95 | Facility: HOSPITAL | Age: 80
End: 2025-01-22
Payer: MEDICARE

## 2025-01-22 DIAGNOSIS — K62.7 RADIATION PROCTITIS: ICD-10-CM

## 2025-01-22 PROCEDURE — 99231 SBSQ HOSP IP/OBS SF/LOW 25: CPT | Performed by: INTERNAL MEDICINE

## 2025-01-30 ENCOUNTER — OFFICE (OUTPATIENT)
Dept: URBAN - METROPOLITAN AREA CLINIC 11 | Facility: CLINIC | Age: 80
End: 2025-01-30
Payer: MEDICARE

## 2025-01-30 VITALS
HEIGHT: 68 IN | OXYGEN SATURATION: 92 % | WEIGHT: 165 LBS | SYSTOLIC BLOOD PRESSURE: 114 MMHG | DIASTOLIC BLOOD PRESSURE: 59 MMHG | HEART RATE: 74 BPM

## 2025-01-30 DIAGNOSIS — K52.0 GASTROENTERITIS AND COLITIS DUE TO RADIATION: ICD-10-CM

## 2025-01-30 DIAGNOSIS — K62.5 HEMORRHAGE OF ANUS AND RECTUM: ICD-10-CM

## 2025-01-30 PROCEDURE — 99214 OFFICE O/P EST MOD 30 MIN: CPT | Performed by: NURSE PRACTITIONER

## 2025-02-15 ENCOUNTER — INPATIENT HOSPITAL (OUTPATIENT)
Dept: URBAN - METROPOLITAN AREA HOSPITAL 95 | Facility: HOSPITAL | Age: 80
End: 2025-02-15
Payer: MEDICARE

## 2025-02-15 DIAGNOSIS — K62.7 RADIATION PROCTITIS: ICD-10-CM

## 2025-02-15 DIAGNOSIS — Z85.46 PERSONAL HISTORY OF MALIGNANT NEOPLASM OF PROSTATE: ICD-10-CM

## 2025-02-15 DIAGNOSIS — D64.9 ANEMIA, UNSPECIFIED: ICD-10-CM

## 2025-02-15 DIAGNOSIS — N18.6 END STAGE RENAL DISEASE: ICD-10-CM

## 2025-02-15 DIAGNOSIS — K92.1 MELENA: ICD-10-CM

## 2025-02-15 PROCEDURE — 99222 1ST HOSP IP/OBS MODERATE 55: CPT | Mod: FS | Performed by: NURSE PRACTITIONER

## 2025-02-16 ENCOUNTER — INPATIENT HOSPITAL (OUTPATIENT)
Dept: URBAN - METROPOLITAN AREA HOSPITAL 95 | Facility: HOSPITAL | Age: 80
End: 2025-02-16
Payer: MEDICARE

## 2025-02-16 DIAGNOSIS — N18.6 END STAGE RENAL DISEASE: ICD-10-CM

## 2025-02-16 DIAGNOSIS — K92.1 MELENA: ICD-10-CM

## 2025-02-16 DIAGNOSIS — K62.7 RADIATION PROCTITIS: ICD-10-CM

## 2025-02-16 DIAGNOSIS — Z85.46 PERSONAL HISTORY OF MALIGNANT NEOPLASM OF PROSTATE: ICD-10-CM

## 2025-02-16 DIAGNOSIS — D64.9 ANEMIA, UNSPECIFIED: ICD-10-CM

## 2025-02-16 PROCEDURE — 99232 SBSQ HOSP IP/OBS MODERATE 35: CPT | Performed by: STUDENT IN AN ORGANIZED HEALTH CARE EDUCATION/TRAINING PROGRAM

## 2025-02-17 ENCOUNTER — INPATIENT HOSPITAL (OUTPATIENT)
Dept: URBAN - METROPOLITAN AREA HOSPITAL 95 | Facility: HOSPITAL | Age: 80
End: 2025-02-17
Payer: MEDICARE

## 2025-02-17 DIAGNOSIS — K92.1 MELENA: ICD-10-CM

## 2025-02-17 DIAGNOSIS — K62.7 RADIATION PROCTITIS: ICD-10-CM

## 2025-02-17 DIAGNOSIS — K62.89 OTHER SPECIFIED DISEASES OF ANUS AND RECTUM: ICD-10-CM

## 2025-02-17 DIAGNOSIS — D64.9 ANEMIA, UNSPECIFIED: ICD-10-CM

## 2025-02-17 PROCEDURE — 45334 SIGMOIDOSCOPY FOR BLEEDING: CPT | Performed by: INTERNAL MEDICINE

## 2025-03-28 ENCOUNTER — INPATIENT HOSPITAL (OUTPATIENT)
Dept: URBAN - METROPOLITAN AREA HOSPITAL 95 | Facility: HOSPITAL | Age: 80
End: 2025-03-28
Payer: MEDICARE

## 2025-03-28 DIAGNOSIS — D64.9 ANEMIA, UNSPECIFIED: ICD-10-CM

## 2025-03-28 DIAGNOSIS — K62.7 RADIATION PROCTITIS: ICD-10-CM

## 2025-03-28 DIAGNOSIS — K92.1 MELENA: ICD-10-CM

## 2025-03-28 PROCEDURE — 99222 1ST HOSP IP/OBS MODERATE 55: CPT | Mod: FS

## 2025-03-29 ENCOUNTER — INPATIENT HOSPITAL (OUTPATIENT)
Dept: URBAN - METROPOLITAN AREA HOSPITAL 95 | Facility: HOSPITAL | Age: 80
End: 2025-03-29
Payer: MEDICARE

## 2025-03-29 DIAGNOSIS — K62.7 RADIATION PROCTITIS: ICD-10-CM

## 2025-03-29 DIAGNOSIS — K92.1 MELENA: ICD-10-CM

## 2025-03-29 DIAGNOSIS — D64.9 ANEMIA, UNSPECIFIED: ICD-10-CM

## 2025-03-29 DIAGNOSIS — N18.6 END STAGE RENAL DISEASE: ICD-10-CM

## 2025-03-29 DIAGNOSIS — Z85.46 PERSONAL HISTORY OF MALIGNANT NEOPLASM OF PROSTATE: ICD-10-CM

## 2025-03-29 PROCEDURE — 99231 SBSQ HOSP IP/OBS SF/LOW 25: CPT

## 2025-03-30 ENCOUNTER — INPATIENT HOSPITAL (OUTPATIENT)
Dept: URBAN - METROPOLITAN AREA HOSPITAL 95 | Facility: HOSPITAL | Age: 80
End: 2025-03-30
Payer: MEDICARE

## 2025-03-30 DIAGNOSIS — K92.1 MELENA: ICD-10-CM

## 2025-03-30 DIAGNOSIS — N18.6 END STAGE RENAL DISEASE: ICD-10-CM

## 2025-03-30 DIAGNOSIS — D64.9 ANEMIA, UNSPECIFIED: ICD-10-CM

## 2025-03-30 DIAGNOSIS — K62.7 RADIATION PROCTITIS: ICD-10-CM

## 2025-03-30 DIAGNOSIS — Z85.46 PERSONAL HISTORY OF MALIGNANT NEOPLASM OF PROSTATE: ICD-10-CM

## 2025-03-30 PROCEDURE — 99232 SBSQ HOSP IP/OBS MODERATE 35: CPT | Performed by: INTERNAL MEDICINE

## 2025-03-31 ENCOUNTER — INPATIENT HOSPITAL (OUTPATIENT)
Dept: URBAN - METROPOLITAN AREA HOSPITAL 95 | Facility: HOSPITAL | Age: 80
End: 2025-03-31
Payer: MEDICARE

## 2025-03-31 DIAGNOSIS — D64.9 ANEMIA, UNSPECIFIED: ICD-10-CM

## 2025-03-31 DIAGNOSIS — Z85.46 PERSONAL HISTORY OF MALIGNANT NEOPLASM OF PROSTATE: ICD-10-CM

## 2025-03-31 DIAGNOSIS — K29.30 CHRONIC SUPERFICIAL GASTRITIS WITHOUT BLEEDING: ICD-10-CM

## 2025-03-31 DIAGNOSIS — K55.21 ANGIODYSPLASIA OF COLON WITH HEMORRHAGE: ICD-10-CM

## 2025-03-31 DIAGNOSIS — D12.4 BENIGN NEOPLASM OF DESCENDING COLON: ICD-10-CM

## 2025-03-31 DIAGNOSIS — K62.7 RADIATION PROCTITIS: ICD-10-CM

## 2025-03-31 PROCEDURE — 45334 SIGMOIDOSCOPY FOR BLEEDING: CPT | Mod: XU | Performed by: STUDENT IN AN ORGANIZED HEALTH CARE EDUCATION/TRAINING PROGRAM

## 2025-03-31 PROCEDURE — 43239 EGD BIOPSY SINGLE/MULTIPLE: CPT | Performed by: STUDENT IN AN ORGANIZED HEALTH CARE EDUCATION/TRAINING PROGRAM

## 2025-03-31 PROCEDURE — 45338 SIGMOIDOSCOPY W/TUMR REMOVE: CPT | Performed by: STUDENT IN AN ORGANIZED HEALTH CARE EDUCATION/TRAINING PROGRAM

## 2025-05-15 ENCOUNTER — INPATIENT HOSPITAL (OUTPATIENT)
Dept: URBAN - METROPOLITAN AREA HOSPITAL 95 | Facility: HOSPITAL | Age: 80
End: 2025-05-15
Payer: MEDICARE

## 2025-05-15 DIAGNOSIS — D64.9 ANEMIA, UNSPECIFIED: ICD-10-CM

## 2025-05-15 DIAGNOSIS — N18.6 END STAGE RENAL DISEASE: ICD-10-CM

## 2025-05-15 DIAGNOSIS — K62.7 RADIATION PROCTITIS: ICD-10-CM

## 2025-05-15 DIAGNOSIS — K92.1 MELENA: ICD-10-CM

## 2025-05-15 PROCEDURE — 99222 1ST HOSP IP/OBS MODERATE 55: CPT | Mod: FS

## 2025-05-16 ENCOUNTER — INPATIENT HOSPITAL (OUTPATIENT)
Dept: URBAN - METROPOLITAN AREA HOSPITAL 95 | Facility: HOSPITAL | Age: 80
End: 2025-05-16
Payer: MEDICARE

## 2025-05-16 DIAGNOSIS — Z85.46 PERSONAL HISTORY OF MALIGNANT NEOPLASM OF PROSTATE: ICD-10-CM

## 2025-05-16 DIAGNOSIS — N18.6 END STAGE RENAL DISEASE: ICD-10-CM

## 2025-05-16 DIAGNOSIS — D64.9 ANEMIA, UNSPECIFIED: ICD-10-CM

## 2025-05-16 DIAGNOSIS — K62.7 RADIATION PROCTITIS: ICD-10-CM

## 2025-05-16 DIAGNOSIS — K92.1 MELENA: ICD-10-CM

## 2025-05-16 PROCEDURE — 45388 COLONOSCOPY W/ABLATION: CPT | Performed by: INTERNAL MEDICINE

## 2025-06-07 ENCOUNTER — INPATIENT HOSPITAL (OUTPATIENT)
Dept: URBAN - METROPOLITAN AREA HOSPITAL 97 | Facility: HOSPITAL | Age: 80
End: 2025-06-07
Payer: MEDICARE

## 2025-06-07 DIAGNOSIS — Z85.46 PERSONAL HISTORY OF MALIGNANT NEOPLASM OF PROSTATE: ICD-10-CM

## 2025-06-07 DIAGNOSIS — N18.6 END STAGE RENAL DISEASE: ICD-10-CM

## 2025-06-07 DIAGNOSIS — K92.1 MELENA: ICD-10-CM

## 2025-06-07 DIAGNOSIS — D64.9 ANEMIA, UNSPECIFIED: ICD-10-CM

## 2025-06-07 DIAGNOSIS — K62.7 RADIATION PROCTITIS: ICD-10-CM

## 2025-06-07 PROCEDURE — 99223 1ST HOSP IP/OBS HIGH 75: CPT | Mod: FS

## 2025-06-07 PROCEDURE — 99214 OFFICE O/P EST MOD 30 MIN: CPT | Mod: FS

## 2025-06-08 ENCOUNTER — ON CAMPUS - OUTPATIENT (OUTPATIENT)
Dept: URBAN - METROPOLITAN AREA HOSPITAL 97 | Facility: HOSPITAL | Age: 80
End: 2025-06-08
Payer: MEDICARE

## 2025-06-08 DIAGNOSIS — D62 ACUTE POSTHEMORRHAGIC ANEMIA: ICD-10-CM

## 2025-06-08 DIAGNOSIS — N18.6 END STAGE RENAL DISEASE: ICD-10-CM

## 2025-06-08 DIAGNOSIS — K62.7 RADIATION PROCTITIS: ICD-10-CM

## 2025-06-08 DIAGNOSIS — K92.1 MELENA: ICD-10-CM

## 2025-06-08 DIAGNOSIS — Z85.46 PERSONAL HISTORY OF MALIGNANT NEOPLASM OF PROSTATE: ICD-10-CM

## 2025-06-08 PROCEDURE — 99214 OFFICE O/P EST MOD 30 MIN: CPT | Performed by: INTERNAL MEDICINE

## 2025-06-09 ENCOUNTER — ON CAMPUS - OUTPATIENT (OUTPATIENT)
Dept: URBAN - METROPOLITAN AREA HOSPITAL 97 | Facility: HOSPITAL | Age: 80
End: 2025-06-09
Payer: MEDICARE

## 2025-06-09 DIAGNOSIS — D62 ACUTE POSTHEMORRHAGIC ANEMIA: ICD-10-CM

## 2025-06-09 DIAGNOSIS — K92.1 MELENA: ICD-10-CM

## 2025-06-09 DIAGNOSIS — K57.30 DIVERTICULOSIS OF LARGE INTESTINE WITHOUT PERFORATION OR ABS: ICD-10-CM

## 2025-06-09 DIAGNOSIS — K62.7 RADIATION PROCTITIS: ICD-10-CM

## 2025-06-09 PROCEDURE — 45334 SIGMOIDOSCOPY FOR BLEEDING: CPT | Performed by: STUDENT IN AN ORGANIZED HEALTH CARE EDUCATION/TRAINING PROGRAM

## 2025-06-16 ENCOUNTER — INPATIENT HOSPITAL (OUTPATIENT)
Dept: URBAN - METROPOLITAN AREA HOSPITAL 95 | Facility: HOSPITAL | Age: 80
End: 2025-06-16
Payer: MEDICARE

## 2025-06-16 DIAGNOSIS — K92.1 MELENA: ICD-10-CM

## 2025-06-16 DIAGNOSIS — Z85.46 PERSONAL HISTORY OF MALIGNANT NEOPLASM OF PROSTATE: ICD-10-CM

## 2025-06-16 DIAGNOSIS — N18.6 END STAGE RENAL DISEASE: ICD-10-CM

## 2025-06-16 DIAGNOSIS — K62.7 RADIATION PROCTITIS: ICD-10-CM

## 2025-06-16 DIAGNOSIS — D64.9 ANEMIA, UNSPECIFIED: ICD-10-CM

## 2025-06-16 PROCEDURE — 99222 1ST HOSP IP/OBS MODERATE 55: CPT | Mod: FS

## 2025-06-17 ENCOUNTER — INPATIENT HOSPITAL (OUTPATIENT)
Dept: URBAN - METROPOLITAN AREA HOSPITAL 95 | Facility: HOSPITAL | Age: 80
End: 2025-06-17
Payer: MEDICARE

## 2025-06-17 DIAGNOSIS — N18.6 END STAGE RENAL DISEASE: ICD-10-CM

## 2025-06-17 DIAGNOSIS — Z85.46 PERSONAL HISTORY OF MALIGNANT NEOPLASM OF PROSTATE: ICD-10-CM

## 2025-06-17 DIAGNOSIS — K92.1 MELENA: ICD-10-CM

## 2025-06-17 DIAGNOSIS — D64.9 ANEMIA, UNSPECIFIED: ICD-10-CM

## 2025-06-17 DIAGNOSIS — K62.7 RADIATION PROCTITIS: ICD-10-CM

## 2025-06-17 PROCEDURE — 99232 SBSQ HOSP IP/OBS MODERATE 35: CPT | Performed by: INTERNAL MEDICINE

## 2025-06-18 ENCOUNTER — INPATIENT HOSPITAL (OUTPATIENT)
Dept: URBAN - METROPOLITAN AREA HOSPITAL 95 | Facility: HOSPITAL | Age: 80
End: 2025-06-18
Payer: MEDICARE

## 2025-06-18 DIAGNOSIS — K92.1 MELENA: ICD-10-CM

## 2025-06-18 DIAGNOSIS — Z85.46 PERSONAL HISTORY OF MALIGNANT NEOPLASM OF PROSTATE: ICD-10-CM

## 2025-06-18 DIAGNOSIS — K62.89 OTHER SPECIFIED DISEASES OF ANUS AND RECTUM: ICD-10-CM

## 2025-06-18 PROCEDURE — 45334 SIGMOIDOSCOPY FOR BLEEDING: CPT | Performed by: INTERNAL MEDICINE

## 2025-06-19 ENCOUNTER — INPATIENT HOSPITAL (OUTPATIENT)
Dept: URBAN - METROPOLITAN AREA HOSPITAL 95 | Facility: HOSPITAL | Age: 80
End: 2025-06-19
Payer: MEDICARE

## 2025-06-19 DIAGNOSIS — N18.6 END STAGE RENAL DISEASE: ICD-10-CM

## 2025-06-19 DIAGNOSIS — K62.7 RADIATION PROCTITIS: ICD-10-CM

## 2025-06-19 DIAGNOSIS — K92.1 MELENA: ICD-10-CM

## 2025-06-19 DIAGNOSIS — D64.9 ANEMIA, UNSPECIFIED: ICD-10-CM

## 2025-06-19 DIAGNOSIS — Z85.46 PERSONAL HISTORY OF MALIGNANT NEOPLASM OF PROSTATE: ICD-10-CM

## 2025-06-19 PROCEDURE — 99232 SBSQ HOSP IP/OBS MODERATE 35: CPT | Performed by: INTERNAL MEDICINE

## 2025-06-20 ENCOUNTER — INPATIENT HOSPITAL (OUTPATIENT)
Dept: URBAN - METROPOLITAN AREA HOSPITAL 95 | Facility: HOSPITAL | Age: 80
End: 2025-06-20
Payer: MEDICARE

## 2025-06-20 DIAGNOSIS — I78.0 HEREDITARY HEMORRHAGIC TELANGIECTASIA: ICD-10-CM

## 2025-06-20 DIAGNOSIS — K92.1 MELENA: ICD-10-CM

## 2025-06-20 DIAGNOSIS — Z85.46 PERSONAL HISTORY OF MALIGNANT NEOPLASM OF PROSTATE: ICD-10-CM

## 2025-06-20 DIAGNOSIS — D64.9 ANEMIA, UNSPECIFIED: ICD-10-CM

## 2025-06-20 DIAGNOSIS — K63.89 OTHER SPECIFIED DISEASES OF INTESTINE: ICD-10-CM

## 2025-06-20 DIAGNOSIS — K62.7 RADIATION PROCTITIS: ICD-10-CM

## 2025-06-20 PROCEDURE — 99232 SBSQ HOSP IP/OBS MODERATE 35: CPT | Performed by: INTERNAL MEDICINE

## 2025-06-21 ENCOUNTER — INPATIENT HOSPITAL (OUTPATIENT)
Dept: URBAN - METROPOLITAN AREA HOSPITAL 95 | Facility: HOSPITAL | Age: 80
End: 2025-06-21
Payer: MEDICARE

## 2025-06-21 DIAGNOSIS — K62.7 RADIATION PROCTITIS: ICD-10-CM

## 2025-06-21 DIAGNOSIS — Z85.46 PERSONAL HISTORY OF MALIGNANT NEOPLASM OF PROSTATE: ICD-10-CM

## 2025-06-21 DIAGNOSIS — K92.1 MELENA: ICD-10-CM

## 2025-06-21 DIAGNOSIS — N18.6 END STAGE RENAL DISEASE: ICD-10-CM

## 2025-06-21 DIAGNOSIS — D64.9 ANEMIA, UNSPECIFIED: ICD-10-CM

## 2025-06-21 PROCEDURE — 99232 SBSQ HOSP IP/OBS MODERATE 35: CPT | Mod: FS | Performed by: NURSE PRACTITIONER

## 2025-06-22 ENCOUNTER — INPATIENT HOSPITAL (OUTPATIENT)
Dept: URBAN - METROPOLITAN AREA HOSPITAL 95 | Facility: HOSPITAL | Age: 80
End: 2025-06-22
Payer: MEDICARE

## 2025-06-22 DIAGNOSIS — K62.7 RADIATION PROCTITIS: ICD-10-CM

## 2025-06-22 DIAGNOSIS — Z85.46 PERSONAL HISTORY OF MALIGNANT NEOPLASM OF PROSTATE: ICD-10-CM

## 2025-06-22 DIAGNOSIS — K92.1 MELENA: ICD-10-CM

## 2025-06-22 DIAGNOSIS — D64.9 ANEMIA, UNSPECIFIED: ICD-10-CM

## 2025-06-22 DIAGNOSIS — N18.6 END STAGE RENAL DISEASE: ICD-10-CM

## 2025-06-22 PROCEDURE — 99232 SBSQ HOSP IP/OBS MODERATE 35: CPT | Mod: FS | Performed by: NURSE PRACTITIONER

## 2025-06-23 ENCOUNTER — INPATIENT HOSPITAL (OUTPATIENT)
Dept: URBAN - METROPOLITAN AREA HOSPITAL 95 | Facility: HOSPITAL | Age: 80
End: 2025-06-23
Payer: MEDICARE

## 2025-06-23 DIAGNOSIS — N18.6 END STAGE RENAL DISEASE: ICD-10-CM

## 2025-06-23 DIAGNOSIS — K62.7 RADIATION PROCTITIS: ICD-10-CM

## 2025-06-23 DIAGNOSIS — D64.9 ANEMIA, UNSPECIFIED: ICD-10-CM

## 2025-06-23 DIAGNOSIS — Z85.46 PERSONAL HISTORY OF MALIGNANT NEOPLASM OF PROSTATE: ICD-10-CM

## 2025-06-23 DIAGNOSIS — K92.1 MELENA: ICD-10-CM

## 2025-06-23 PROCEDURE — 99231 SBSQ HOSP IP/OBS SF/LOW 25: CPT | Performed by: INTERNAL MEDICINE

## 2025-07-17 ENCOUNTER — INPATIENT HOSPITAL (OUTPATIENT)
Dept: URBAN - METROPOLITAN AREA HOSPITAL 95 | Facility: HOSPITAL | Age: 80
End: 2025-07-17
Payer: MEDICARE

## 2025-07-17 DIAGNOSIS — K62.7 RADIATION PROCTITIS: ICD-10-CM

## 2025-07-17 DIAGNOSIS — D12.3 BENIGN NEOPLASM OF TRANSVERSE COLON: ICD-10-CM

## 2025-07-17 DIAGNOSIS — K31.819 ANGIODYSPLASIA OF STOMACH AND DUODENUM WITHOUT BLEEDING: ICD-10-CM

## 2025-07-17 DIAGNOSIS — D12.4 BENIGN NEOPLASM OF DESCENDING COLON: ICD-10-CM

## 2025-07-17 DIAGNOSIS — D12.0 BENIGN NEOPLASM OF CECUM: ICD-10-CM

## 2025-07-17 DIAGNOSIS — K92.1 MELENA: ICD-10-CM

## 2025-07-17 DIAGNOSIS — I78.1 NEVUS, NON-NEOPLASTIC: ICD-10-CM

## 2025-07-17 DIAGNOSIS — K57.32 DIVERTICULITIS OF LARGE INTESTINE WITHOUT PERFORATION OR ABS: ICD-10-CM

## 2025-07-17 PROCEDURE — 45380 COLONOSCOPY AND BIOPSY: CPT | Mod: XU | Performed by: INTERNAL MEDICINE

## 2025-07-17 PROCEDURE — 45385 COLONOSCOPY W/LESION REMOVAL: CPT | Performed by: INTERNAL MEDICINE

## 2025-07-17 PROCEDURE — 43270 EGD LESION ABLATION: CPT | Performed by: INTERNAL MEDICINE

## 2025-07-18 ENCOUNTER — INPATIENT HOSPITAL (OUTPATIENT)
Dept: URBAN - METROPOLITAN AREA HOSPITAL 95 | Facility: HOSPITAL | Age: 80
End: 2025-07-18
Payer: MEDICARE

## 2025-07-18 DIAGNOSIS — Z85.46 PERSONAL HISTORY OF MALIGNANT NEOPLASM OF PROSTATE: ICD-10-CM

## 2025-07-18 DIAGNOSIS — K31.811 ANGIODYSPLASIA OF STOMACH AND DUODENUM WITH BLEEDING: ICD-10-CM

## 2025-07-18 DIAGNOSIS — D64.9 ANEMIA, UNSPECIFIED: ICD-10-CM

## 2025-07-18 DIAGNOSIS — K62.7 RADIATION PROCTITIS: ICD-10-CM

## 2025-07-18 DIAGNOSIS — N18.6 END STAGE RENAL DISEASE: ICD-10-CM

## 2025-07-18 PROCEDURE — 99231 SBSQ HOSP IP/OBS SF/LOW 25: CPT | Performed by: INTERNAL MEDICINE
